# Patient Record
Sex: MALE | URBAN - METROPOLITAN AREA
[De-identification: names, ages, dates, MRNs, and addresses within clinical notes are randomized per-mention and may not be internally consistent; named-entity substitution may affect disease eponyms.]

---

## 2021-07-01 ENCOUNTER — APPOINTMENT (OUTPATIENT)
Dept: URBAN - METROPOLITAN AREA CLINIC 260 | Age: 31
Setting detail: DERMATOLOGY
End: 2021-07-01

## 2021-07-01 VITALS — WEIGHT: 300 LBS | HEIGHT: 77 IN

## 2021-07-01 DIAGNOSIS — L91.8 OTHER HYPERTROPHIC DISORDERS OF THE SKIN: ICD-10-CM

## 2021-07-01 PROCEDURE — OTHER COUNSELING: OTHER

## 2021-07-01 PROCEDURE — OTHER BENIGN DESTRUCTION: OTHER

## 2021-07-01 PROCEDURE — 99202 OFFICE O/P NEW SF 15 MIN: CPT

## 2021-07-01 ASSESSMENT — LOCATION DETAILED DESCRIPTION DERM
LOCATION DETAILED: LEFT INFERIOR POSTERIOR NECK
LOCATION DETAILED: RIGHT SUPERIOR LATERAL NECK
LOCATION DETAILED: LEFT CLAVICULAR NECK
LOCATION DETAILED: MID POSTERIOR NECK
LOCATION DETAILED: LEFT INFERIOR LATERAL NECK
LOCATION DETAILED: RIGHT INFERIOR LATERAL NECK
LOCATION DETAILED: LEFT SUPERIOR LATERAL NECK
LOCATION DETAILED: RIGHT CLAVICULAR NECK
LOCATION DETAILED: RIGHT INFERIOR POSTERIOR NECK
LOCATION DETAILED: LEFT SUPERIOR ANTERIOR NECK

## 2021-07-01 ASSESSMENT — LOCATION SIMPLE DESCRIPTION DERM
LOCATION SIMPLE: RIGHT ANTERIOR NECK
LOCATION SIMPLE: POSTERIOR NECK
LOCATION SIMPLE: LEFT ANTERIOR NECK

## 2021-07-01 ASSESSMENT — LOCATION ZONE DERM: LOCATION ZONE: NECK

## 2021-07-01 NOTE — PROCEDURE: BENIGN DESTRUCTION
Medical Necessity Clause: This procedure was medically necessary because the lesions that were treated were:
Render Note In Bullet Format When Appropriate: No
Consent: The patient's consent was obtained including but not limited to risks of crusting, scabbing, blistering, scarring, darker or lighter pigmentary change, recurrence, incomplete removal and infection.
Post-Care Instructions: I reviewed with the patient in detail post-care instructions. Patient is to wear sunprotection, and avoid picking at any of the treated lesions. Pt may apply Vaseline to crusted or scabbing areas.
Anesthesia Volume In Cc: 0.5
Detail Level: Zone
Medical Necessity Information: It is in your best interest to select a reason for this procedure from the list below. All of these items fulfill various CMS LCD requirements except the new and changing color options.
Total Number Of Lesions Treated: 29

## 2022-08-02 ENCOUNTER — OFFICE VISIT (OUTPATIENT)
Dept: FAMILY MEDICINE | Facility: CLINIC | Age: 32
End: 2022-08-02
Payer: COMMERCIAL

## 2022-08-02 VITALS
TEMPERATURE: 98.2 F | SYSTOLIC BLOOD PRESSURE: 139 MMHG | BODY MASS INDEX: 40.43 KG/M2 | WEIGHT: 315 LBS | DIASTOLIC BLOOD PRESSURE: 84 MMHG | OXYGEN SATURATION: 94 % | HEART RATE: 83 BPM | HEIGHT: 74 IN

## 2022-08-02 DIAGNOSIS — F41.9 ANXIETY AND DEPRESSION: ICD-10-CM

## 2022-08-02 DIAGNOSIS — E66.813 CLASS 3 SEVERE OBESITY WITH SERIOUS COMORBIDITY AND BODY MASS INDEX (BMI) OF 50.0 TO 59.9 IN ADULT, UNSPECIFIED OBESITY TYPE (H): ICD-10-CM

## 2022-08-02 DIAGNOSIS — I10 BENIGN ESSENTIAL HYPERTENSION: ICD-10-CM

## 2022-08-02 DIAGNOSIS — E66.01 MORBID OBESITY (H): ICD-10-CM

## 2022-08-02 DIAGNOSIS — Z11.1 SCREENING EXAMINATION FOR PULMONARY TUBERCULOSIS: ICD-10-CM

## 2022-08-02 DIAGNOSIS — G47.30 SLEEP APNEA, UNSPECIFIED TYPE: ICD-10-CM

## 2022-08-02 DIAGNOSIS — Z80.0 FAMILY HISTORY OF COLON CANCER: ICD-10-CM

## 2022-08-02 DIAGNOSIS — F19.11 HISTORY OF SUBSTANCE ABUSE (H): ICD-10-CM

## 2022-08-02 DIAGNOSIS — E66.01 CLASS 3 SEVERE OBESITY WITH SERIOUS COMORBIDITY AND BODY MASS INDEX (BMI) OF 50.0 TO 59.9 IN ADULT, UNSPECIFIED OBESITY TYPE (H): ICD-10-CM

## 2022-08-02 DIAGNOSIS — Z00.00 HEALTH MAINTENANCE EXAMINATION: Primary | ICD-10-CM

## 2022-08-02 DIAGNOSIS — F32.A ANXIETY AND DEPRESSION: ICD-10-CM

## 2022-08-02 DIAGNOSIS — Z11.3 ROUTINE SCREENING FOR STI (SEXUALLY TRANSMITTED INFECTION): ICD-10-CM

## 2022-08-02 LAB
CREAT UR-MCNC: 294 MG/DL
ERYTHROCYTE [DISTWIDTH] IN BLOOD BY AUTOMATED COUNT: 15.5 % (ref 10–15)
HBA1C MFR BLD: 6 %
HCT VFR BLD AUTO: 41.5 % (ref 40–53)
HGB BLD-MCNC: 13.7 G/DL (ref 13.3–17.7)
MCH RBC QN AUTO: 25.8 PG (ref 26.5–33)
MCHC RBC AUTO-ENTMCNC: 33 G/DL (ref 31.5–36.5)
MCV RBC AUTO: 78 FL (ref 78–100)
MICROALBUMIN UR-MCNC: 46.2 MG/L
MICROALBUMIN/CREAT UR: 15.71 MG/G CR (ref 0–17)
PLATELET # BLD AUTO: 266 10E3/UL (ref 150–450)
RBC # BLD AUTO: 5.31 10E6/UL (ref 4.4–5.9)
WBC # BLD AUTO: 10.4 10E3/UL (ref 4–11)

## 2022-08-02 PROCEDURE — 86803 HEPATITIS C AB TEST: CPT | Performed by: NURSE PRACTITIONER

## 2022-08-02 PROCEDURE — 87591 N.GONORRHOEAE DNA AMP PROB: CPT | Performed by: NURSE PRACTITIONER

## 2022-08-02 PROCEDURE — 85027 COMPLETE CBC AUTOMATED: CPT | Performed by: NURSE PRACTITIONER

## 2022-08-02 PROCEDURE — 82043 UR ALBUMIN QUANTITATIVE: CPT | Performed by: NURSE PRACTITIONER

## 2022-08-02 PROCEDURE — 86708 HEPATITIS A ANTIBODY: CPT | Performed by: NURSE PRACTITIONER

## 2022-08-02 PROCEDURE — 86481 TB AG RESPONSE T-CELL SUSP: CPT | Performed by: NURSE PRACTITIONER

## 2022-08-02 PROCEDURE — 87340 HEPATITIS B SURFACE AG IA: CPT | Performed by: NURSE PRACTITIONER

## 2022-08-02 PROCEDURE — 86704 HEP B CORE ANTIBODY TOTAL: CPT | Performed by: NURSE PRACTITIONER

## 2022-08-02 PROCEDURE — 87389 HIV-1 AG W/HIV-1&-2 AB AG IA: CPT | Performed by: NURSE PRACTITIONER

## 2022-08-02 PROCEDURE — 87491 CHLMYD TRACH DNA AMP PROBE: CPT | Performed by: NURSE PRACTITIONER

## 2022-08-02 PROCEDURE — 83036 HEMOGLOBIN GLYCOSYLATED A1C: CPT | Performed by: NURSE PRACTITIONER

## 2022-08-02 PROCEDURE — 84443 ASSAY THYROID STIM HORMONE: CPT | Performed by: NURSE PRACTITIONER

## 2022-08-02 PROCEDURE — 86780 TREPONEMA PALLIDUM: CPT | Performed by: NURSE PRACTITIONER

## 2022-08-02 PROCEDURE — 86706 HEP B SURFACE ANTIBODY: CPT | Performed by: NURSE PRACTITIONER

## 2022-08-02 PROCEDURE — 80053 COMPREHEN METABOLIC PANEL: CPT | Performed by: NURSE PRACTITIONER

## 2022-08-02 PROCEDURE — 80061 LIPID PANEL: CPT | Performed by: NURSE PRACTITIONER

## 2022-08-02 RX ORDER — MIRTAZAPINE 7.5 MG/1
7.5 TABLET, FILM COATED ORAL AT BEDTIME
COMMUNITY

## 2022-08-02 RX ORDER — HYDROCHLOROTHIAZIDE 25 MG/1
25 TABLET ORAL DAILY
Qty: 30 TABLET | Refills: 1 | Status: SHIPPED | OUTPATIENT
Start: 2022-08-02

## 2022-08-02 ASSESSMENT — ANXIETY QUESTIONNAIRES
5. BEING SO RESTLESS THAT IT IS HARD TO SIT STILL: SEVERAL DAYS
2. NOT BEING ABLE TO STOP OR CONTROL WORRYING: MORE THAN HALF THE DAYS
IF YOU CHECKED OFF ANY PROBLEMS ON THIS QUESTIONNAIRE, HOW DIFFICULT HAVE THESE PROBLEMS MADE IT FOR YOU TO DO YOUR WORK, TAKE CARE OF THINGS AT HOME, OR GET ALONG WITH OTHER PEOPLE: SOMEWHAT DIFFICULT
1. FEELING NERVOUS, ANXIOUS, OR ON EDGE: SEVERAL DAYS
6. BECOMING EASILY ANNOYED OR IRRITABLE: SEVERAL DAYS
3. WORRYING TOO MUCH ABOUT DIFFERENT THINGS: MORE THAN HALF THE DAYS
7. FEELING AFRAID AS IF SOMETHING AWFUL MIGHT HAPPEN: NOT AT ALL
GAD7 TOTAL SCORE: 8
GAD7 TOTAL SCORE: 8

## 2022-08-02 ASSESSMENT — PATIENT HEALTH QUESTIONNAIRE - PHQ9
SUM OF ALL RESPONSES TO PHQ QUESTIONS 1-9: 9
5. POOR APPETITE OR OVEREATING: SEVERAL DAYS

## 2022-08-02 NOTE — NURSING NOTE
"ROOM:1  SHELBY BECKMAN    Preferred Name: Min     32 year old  Chief Complaint   Patient presents with     Physical       Blood pressure (!) 154/102, pulse 81, temperature 98.2  F (36.8  C), temperature source Oral, height 1.89 m (6' 2.4\"), weight (!) 186.9 kg (412 lb 1.6 oz), SpO2 94 %. Body mass index is 52.34 kg/m .  BP completed using cuff size:    There is no problem list on file for this patient.      Wt Readings from Last 2 Encounters:   08/02/22 (!) 186.9 kg (412 lb 1.6 oz)     BP Readings from Last 3 Encounters:   08/02/22 (!) 154/102       Not on File    No current outpatient medications on file.     No current facility-administered medications for this visit.            Honoring Choices - Health Care Directive Guide offered to patient at time of visit.    There are no preventive care reminders to display for this patient.    Immunization History   Administered Date(s) Administered     DTAP (<7y) 1991, 1991, 03/25/1992, 07/05/1994     HepA-Adult 05/06/2011     HepA-ped 2 Dose 07/29/2008     HepB, Unspecified 1991, 1991, 07/05/1994     Hib, Unspecified 1991, 1991, 03/25/1992     Influenza (IIV3) PF 02/21/2007     MMR 07/05/1994, 04/07/2004     Meningococcal (Menactra ) 07/29/2008     Polio, Unspecified  1991, 1991, 07/05/1994     Td (Adult), Adsorbed 04/07/2004     Tdap (Adacel,Boostrix) 07/29/2008       No results found for: PAP    No lab results found.    No flowsheet data found.    No flowsheet data found.    No flowsheet data found.    No flowsheet data found.    Jillian Avila, CEDRIC    August 2, 2022 10:17 AM        "

## 2022-08-02 NOTE — PATIENT INSTRUCTIONS
Health Maintenance  Labs: CMP, CBC, Lipids, STI screen, TB screen, Hepatitis Screen  Dental referral  Healthy diet and weight loss:  more vegetables, less carbohydrates, fried foods, sweets, sweetened beverages, breads, cereals, fast foods, snack foods.  Consider medication to help with weight loss.   Exercise: 30 minutes 5 times per week  Tdap today    High blood pressure   Weight loss and diet/exercise as above. Reduce salt and fried foods in diet.   Hydrochlorothiazide 25mg daily. Will make you urinate more  Eat banana, orange juice as good source of potassium. Follow up 1 week to review lab results  Microalbumin  Headaches may be due to high blood pressure  Elevate legs at rest    Obesity  May benefit from medication to assist with weight loss. Will check labs today: CMP, lipids, TSH and A1c    Sleep Apnea  Referred to Sleep Clinic. EDSON Batista can help you make appointment

## 2022-08-02 NOTE — LETTER
August 5, 2022      Min E Dylan  [address] [facility] [address]        Dear ,    We are writing to inform you of your test results.    Please make an appointment with your provider to review or follow up on your test results.  Appointments can be made by calling [phone].    Resulted Qcjjmb   Comprehensive metabolic panel   Result Value Ref Range    Sodium 143 136 - 145 mmol/L    Potassium 3.7 3.4 - 5.3 mmol/L    Creatinine 0.98 0.67 - 1.17 mg/dL    Urea Nitrogen 16.6 6.0 - 20.0 mg/dL    Chloride 106 98 - 107 mmol/L    Carbon Dioxide (CO2) 23 22 - 29 mmol/L    Anion Gap 14 7 - 15 mmol/L    Glucose 90 70 - 99 mg/dL    Calcium 8.8 8.6 - 10.0 mg/dL    Protein Total 7.4 6.4 - 8.3 g/dL    Albumin 4.2 3.5 - 5.2 g/dL    Bilirubin Total 0.5 <=1.2 mg/dL    Alkaline Phosphatase 71 40 - 129 U/L    AST 53 (H) 10 - 50 U/L    ALT 65 (H) 10 - 50 U/L    GFR Estimate >90 >60 mL/min/1.73m2      Comment:      Effective December 21, 2021 eGFRcr in adults is calculated using the 2021 CKD-EPI creatinine equation which includes age and gender (Chi et al., NE, DOI: 10.1056/LYGOpu7529016)   CBC with platelets   Result Value Ref Range    WBC Count 10.4 4.0 - 11.0 10e3/uL    RBC Count 5.31 4.40 - 5.90 10e6/uL    Hemoglobin 13.7 13.3 - 17.7 g/dL    Hematocrit 41.5 40.0 - 53.0 %    MCV 78 78 - 100 fL    MCH 25.8 (L) 26.5 - 33.0 pg    MCHC 33.0 31.5 - 36.5 g/dL    RDW 15.5 (H) 10.0 - 15.0 %    Platelet Count 266 150 - 450 10e3/uL   Lipid panel reflex to direct LDL Fasting   Result Value Ref Range    Cholesterol 152 <200 mg/dL    Triglycerides 149 <150 mg/dL    Direct Measure HDL 27 (L) >=40 mg/dL    LDL Cholesterol Calculated 95 <=100 mg/dL    Non HDL Cholesterol 125 <130 mg/dL    Narrative    Cholesterol  Desirable:  <200 mg/dL    Triglycerides  Normal:  Less than 150 mg/dL  Borderline High:  150-199 mg/dL  High:  200-499 mg/dL  Very High:  Greater than or equal to 500 mg/dL    Direct Measure HDL  Female:  Greater  than or equal to 50 mg/dL   Male:  Greater than or equal to 40 mg/dL    LDL Cholesterol  Desirable:  <100mg/dL  Above Desirable:  100-129 mg/dL   Borderline High:  130-159 mg/dL   High:  160-189 mg/dL   Very High:  >= 190 mg/dL    Non HDL Cholesterol  Desirable:  130 mg/dL  Above Desirable:  130-159 mg/dL  Borderline High:  160-189 mg/dL  High:  190-219 mg/dL  Very High:  Greater than or equal to 220 mg/dL   Hemoglobin A1c   Result Value Ref Range    Hemoglobin A1C 6.0 (H) <5.7 %      Comment:      Normal <5.7%   Prediabetes 5.7-6.4%    Diabetes 6.5% or higher     Note: Adopted from ADA consensus guidelines.   TSH with free T4 reflex   Result Value Ref Range    TSH 2.28 0.30 - 4.20 uIU/mL   Treponema Abs w Reflex to RPR and Titer   Result Value Ref Range    Treponema Antibody Total Nonreactive Nonreactive   HIV Antigen Antibody Combo   Result Value Ref Range    HIV Antigen Antibody Combo Nonreactive Nonreactive      Comment:      HIV-1 p24 Ag & HIV-1/HIV-2 Ab Not Detected   Hepatitis Antibody A IgG   Result Value Ref Range    Hepatitis A Antibody IgG Reactive (A) Nonreactive    Narrative    This assay cannot be used for the diagnosis of acute HAV infection.   Hepatitis B surface antigen   Result Value Ref Range    Hepatitis B Surface Antigen Nonreactive Nonreactive   Hepatitis B Surface Antibody   Result Value Ref Range    Hepatitis B Surface Antibody 12.79 <8.00 m[IU]/mL      Comment:      Reactive, Patient is considered to be immune to infection with hepatitis B when the value is greater than or equal to 12.0 mIU/mL.   Hepatitis B core antibody   Result Value Ref Range    Hepatitis B Core Antibody Total Nonreactive Nonreactive   Hepatitis C Screen Reflex to HCV RNA Quant and Genotype   Result Value Ref Range    Hepatitis C Antibody Nonreactive Nonreactive    Narrative    Assay performance characteristics have not been established for newborns, infants, and children.   Quantiferon TB Gold Plus Grey Tube   Result  Value Ref Range    Quantiferon Nil Tube 0.03 IU/mL   Quantiferon TB Gold Plus Green Tube   Result Value Ref Range    Quantiferon TB1 Tube 0.04 IU/mL   Quantiferon TB Gold Plus Yellow Tube   Result Value Ref Range    Quantiferon TB2 Tube 0.04    Quantiferon TB Gold Plus Purple Tube   Result Value Ref Range    Quantiferon Mitogen 10.00 IU/mL   NEISSERIA GONORRHOEA PCR   Result Value Ref Range    Neisseria gonorrhoeae Negative Negative      Comment:      Negative for N. gonorrhoeae rRNA by transcription mediated amplification. A negative result by transcription mediated amplification does not preclude the presence of C. trachomatis infection because results are dependent on proper and adequate collection, absence of inhibitors and sufficient rRNA to be detected.   CHLAMYDIA TRACHOMATIS PCR   Result Value Ref Range    Chlamydia trachomatis Negative Negative      Comment:      A negative result by transcription mediated amplification does not preclude the presence of C. trachomatis infection because results are dependent on proper and adequate collection, absence of inhibitors and sufficient rRNA to be detected.   Albumin Random Urine Quantitative with Creat Ratio   Result Value Ref Range    Albumin Urine mg/L 46.2 mg/L      Comment:      The reference ranges have not been established in urine albumin. The results should be integrated into the clinical context for interpretation.    Albumin Urine mg/g Cr 15.71 0.00 - 17.00 mg/g Cr      Comment:      Microalbuminuria is defined as an albumin:creatinine ratio of 17 to 299 for males and 25 to 299 for females. A ratio of albumin:creatinine of 300 or higher is indicative of overt proteinuria.  Due to biologic variability, positive results should be confirmed by a second, first-morning random or 24-hour timed urine specimen. If there is discrepancy, a third specimen is recommended. When 2 out of 3 results are in the microalbuminuria range, this is evidence for incipient  nephropathy and warrants increased efforts at glucose control, blood pressure control, and institution of therapy with an angiotensin-converting-enzyme (ACE) inhibitor (if the patient can tolerate it).      Creatinine Urine mg/dL 294.0 mg/dL      Comment:      The reference ranges have not been established in urine creatinine. The results should be integrated into the clinical context for interpretation.   Quantiferon TB Gold Plus   Result Value Ref Range    Quantiferon-TB Gold Plus Negative Negative      Comment:      No interferon gamma response to M.tuberculosis antigens was detected. Infection with M.tuberculosis is unlikely, however a single negative result does not exclude infection. In patients at high risk for infection, a second test should be considered in accordance with the 2017 ATS/IDSA/CDC Clinical Pract  ice Guidelines for Diagnosis of Tuberculosis in Adults and Children     TB1 Ag minus Nil Value 0.01 IU/mL    TB2 Ag minus Nil Value 0.01 IU/mL    Mitogen minus Nil Result 9.97 IU/mL    Nil Result 0.03 IU/mL   Azar Richardson, here are your lab results. Your liver enzymes are a little high, so we should recheck these in a couple of weeks. Your 3 month blood sugar average is above normal, meaning you have pre-diabetes. Working on low fat, low carbohydrate diet (fewer fried foods, salty foods, snack and fast foods, sweets and sweetened bevarages, breads and cereals) will be helpful. Try to get more fresh vegetables in your diet. This will help reduce your blood pressure also. Working on weight loss will be helpful. If you would like to go to a weight management clinic, let me know. The rest of your labs are normal. Your kidney function is okay, you are not anemic. Your thyroid is normal. Screening for sexually transmitted infections and tuberculosis is negative. Please let me know if you have questions. Thank you.    If you have any questions or concerns, please call the clinic at the number listed above.        Sincerely,      SHARMIN Gonzalez CNP

## 2022-08-02 NOTE — NURSING NOTE
Depression Response    Patient completed the PHQ-9 assessment for depression and scored >9? Yes  Question 9 on the PHQ-9 was positive for suicidality? No  Does patient have current mental health provider? Yes, seeing therapist at [facility]    Is this a virtual visit? No    I personally notified the following: visit provider

## 2022-08-02 NOTE — PROGRESS NOTES
Patient: Min Richardson YOB: 1990  Date of Exam:    Arrival Time: 09 53 AM  Departure Time: 11 10 PM provider ended. Left clinic 11:35am    Allergies: No Known Allergies    Patient Concerns:   Chief Complaint   Patient presents with     Physical       Immunizations:   Most Recent Immunizations   Administered Date(s) Administered     DTAP (<7y) 1994     HepA-Adult 2011     HepA-ped 2 Dose 2008     HepB, Unspecified 1994     Hib, Unspecified 1992     Influenza (IIV3) PF 2007     MMR 2004     Meningococcal (Menactra ) 2008     Polio, Unspecified  1994     Td (Adult), Adsorbed 2004     Tdap (Adacel,Boostrix) 2008       Do you need any refills on your Medications today? No    Free of communicable diseases? No observable findings for infectious disease  32 year-old male obese male presents to clinic for physical exam. Has been at [facility] x 2 months. Drug of choice Fentanyl. No prior treatments. Denies health concerns.       MH: Getting counseling at [facility]. Resides [facility]. Was started on med Mirtazapine at  1 week ago but reports only getting this one night. Was supposed to help with mood and sleep. Reports he is hesitant to take meds, but could try it.  Denies suicidal thoughts. Has not been on medications for depression or anxiety in the past.     Sleep apnea, wakes every 2 hours, not rested. Does not have CPAP.     Health Maintenance:   No eye exam or dental exam.   FMH: mother  of colon cancer age 47 years.     Past Medical History Reviewed? Yes.  ROS  GEN: negative for fever, fatigue. Notes weight change: increase since HS, but worse over past 1year. Denies [facility] DM  HEENT: negative for vision changes, eye irritation, ear pain, nasal congestion, rhinorrhea, sore throat or teeth pain  NECK: negative for pain stiffness  RESP: negative for SOB, cough, wheeze  CV: negative for chest pain, irregular heart beat,  "peripheral edema  GI: negative for nausea or vomiting, abdominal pain, heartburn, stool pattern change, constipation or diarrhea. Daily BM  : negative for dysuria, urgency, frequency or penile discharge. Denies risk for STI.   MSK: negative for bone or joint pain or stiffness  NEURO: occasional headache; denies dizziness, numbness, tingling or weakness  ENDO: negative for increased thirst or urination or temperature intolerance. Does report weight gain and dry skin  HEME: negative for bruising or bleeding  DERM: negative for rash or lesions  MOOD: endorses some depressed mood and anxiety        General Physical Exam:  Vitals: BP (!) 154/102   Pulse 81   Temp 98.2  F (36.8  C) (Oral)   Ht 1.89 m (6' 2.4\")   Wt (!) 186.9 kg (412 lb 1.6 oz)   SpO2 94%   BMI 52.34 kg/m     BP elevated, recheck:     BMI 52.34    GEN: alert, in no acute distress. Appropriate to conversation  HEENT: Eyes clear, DENICE, EOM intact. Discs crisp. Ears: TMs gray with LR, canals intact. Nose: mild edema with scant nasal discharge, OP clear; dentition intact. Tonsils without erythema or edema.   NECK: supple. Thyroid smooth and not enlarged.   LYMPH: negative, nontender  RESP: :Lungs clear to auscultation with air entry throughout  CV: HRRR, S1, S2 no murmur, rub or gallop. Pedal pulses +2 bilaterally with 1+ pitting edema ankles, R>L.  ABD: Soft rounded with BSx4. No HSM,  nontender. No masses  : declined  MSK: Full ROM spine, extremities. No bony deformity or swelling over joints  NEURO: Cranial nerves 2-12 intact.    MOOD: appropriate, intact  DERM: no lesions or rashes. Skin turgor smooth and elastic. Has tattoos    CRISTOBAL-7 SCORE 8/2/2022   Total Score 8       PHQ 8/2/2022   PHQ-9 Total Score 9   Q9: Thoughts of better off dead/self-harm past 2 weeks Not at all       Diagnoses:   1. Health maintenance examination  Reviewed healthy living, given information about high BP, healthy diet and weight loss. Discussed follow up in 1 week to " review lab results, additional treatments. Consider referral for weight management. Risk factors: obesity, HTN, sleep apnea, physical labor, history substance abuse  - TDAP VACCINE (Adacel, Boostrix)  [[id number]]  - Dental Referral    2. History of substance abuse (H)  Routine lab work up.   - Comprehensive metabolic panel; Future  - CBC with platelets; Future  - Comprehensive metabolic panel  - CBC with platelets    3. Class 3 severe obesity with serious comorbidity and body mass index (BMI) of 50.0 to 59.9 in adult, unspecified obesity type (H)  As above, consider referral for weight management. Will check A1c, Might benefit from metformin or ozempic. Reviewed lifestyle  - Lipid panel reflex to direct LDL Fasting; Future  - Hemoglobin A1c; Future  - TSH with free T4 reflex; Future  - Hepatitis Antibody A IgG; Future  - Hepatitis B surface antigen; Future  - Hepatitis B Surface Antibody; Future  - Hepatitis B core antibody; Future  - Hepatitis C Screen Reflex to HCV RNA Quant and Genotype; Future  - Lipid panel reflex to direct LDL Fasting  - Hemoglobin A1c  - TSH with free T4 reflex  - Hepatitis Antibody A IgG  - Hepatitis B surface antigen  - Hepatitis B Surface Antibody  - Hepatitis B core antibody  - Hepatitis C Screen Reflex to HCV RNA Quant and Genotype    4. Benign essential hypertension  As above: reduce salt and fat in diet. Reviewed healthy lifestyle. Discussed side effects of medication, high potassium foods reviewed. Plan recheck K+ in 1 week.   - hydrochlorothiazide (HYDRODIURIL) 25 MG tablet; Take 1 tablet (25 mg) by mouth daily  Dispense: 30 tablet; Refill: 1  - Albumin Random Urine Quantitative with Creat Ratio; Future    5. Sleep apnea, unspecified type  Referred to sleep clinic  - Sleep Lab IP Consult    6. Routine screening for STI (sexually transmitted infection)  Asymptomatic, denies risk; routine screen  - NEISSERIA GONORRHOEA PCR; Future  - CHLAMYDIA TRACHOMATIS PCR; Future  - Treponema Abs w  Reflex to RPR and Titer; Future  - HIV Antigen Antibody Combo; Future  - Treponema Abs w Reflex to RPR and Titer  - HIV Antigen Antibody Combo    7. Screening examination for pulmonary tuberculosis  Asymptomatic, routine screen  - Quantiferon TB Gold Plus; Future  - Quantiferon TB Gold Plus    8. Morbid obesity (H)  As above    Recommended Diet and Special Instructions: Yes: reduced salt and fat; weight loss.   Limitations or restrictions for activities: No but needs BP control  Information Pertinent to treatment in case of emergency has cardiac risk given obesity and elevated BP  Discussed taking mirtazapine regularly for next 1 week. Can titrate and/or refill at follow up encounter.   Discussed [facility] colon cancer, plan to commence screening ~age 38 years    Medication Changes:   MEDICATIONS:   Orders Placed This Encounter   Medications     mirtazapine (REMERON) 7.5 MG tablet     Sig: Take 7.5 mg by mouth At Bedtime     hydrochlorothiazide (HYDRODIURIL) 25 MG tablet     Sig: Take 1 tablet (25 mg) by mouth daily     Dispense:  30 tablet     Refill:  1     There are no discontinued medications.       - Continue other medications without change    Referrals   Referral to Sleep clinic and Referral to Dental - Please call [phone] to schedule your appointment    Follow up plan   Please make an in clinic appointment for follow up in 1  week for lab results, weight management follow up, BP and med management.      SHARMIN Gonzalez CNP

## 2022-08-03 LAB
ALBUMIN SERPL BCG-MCNC: 4.2 G/DL (ref 3.5–5.2)
ALP SERPL-CCNC: 71 U/L (ref 40–129)
ALT SERPL W P-5'-P-CCNC: 65 U/L (ref 10–50)
ANION GAP SERPL CALCULATED.3IONS-SCNC: 14 MMOL/L (ref 7–15)
AST SERPL W P-5'-P-CCNC: 53 U/L (ref 10–50)
BILIRUB SERPL-MCNC: 0.5 MG/DL
BUN SERPL-MCNC: 16.6 MG/DL (ref 6–20)
C TRACH DNA SPEC QL NAA+PROBE: NEGATIVE
CALCIUM SERPL-MCNC: 8.8 MG/DL (ref 8.6–10)
CHLORIDE SERPL-SCNC: 106 MMOL/L (ref 98–107)
CHOLEST SERPL-MCNC: 152 MG/DL
CREAT SERPL-MCNC: 0.98 MG/DL (ref 0.67–1.17)
DEPRECATED HCO3 PLAS-SCNC: 23 MMOL/L (ref 22–29)
GFR SERPL CREATININE-BSD FRML MDRD: >90 ML/MIN/1.73M2
GLUCOSE SERPL-MCNC: 90 MG/DL (ref 70–99)
HAV IGG SER QL IA: REACTIVE
HBV CORE AB SERPL QL IA: NONREACTIVE
HBV SURFACE AB SERPL IA-ACNC: 12.79 M[IU]/ML
HBV SURFACE AG SERPL QL IA: NONREACTIVE
HCV AB SERPL QL IA: NONREACTIVE
HDLC SERPL-MCNC: 27 MG/DL
HIV 1+2 AB+HIV1 P24 AG SERPL QL IA: NONREACTIVE
LDLC SERPL CALC-MCNC: 95 MG/DL
N GONORRHOEA DNA SPEC QL NAA+PROBE: NEGATIVE
NONHDLC SERPL-MCNC: 125 MG/DL
POTASSIUM SERPL-SCNC: 3.7 MMOL/L (ref 3.4–5.3)
PROT SERPL-MCNC: 7.4 G/DL (ref 6.4–8.3)
SODIUM SERPL-SCNC: 143 MMOL/L (ref 136–145)
T PALLIDUM AB SER QL: NONREACTIVE
TRIGL SERPL-MCNC: 149 MG/DL
TSH SERPL DL<=0.005 MIU/L-ACNC: 2.28 UIU/ML (ref 0.3–4.2)

## 2022-08-04 LAB
GAMMA INTERFERON BACKGROUND BLD IA-ACNC: 0.03 IU/ML
M TB IFN-G BLD-IMP: NEGATIVE
M TB IFN-G CD4+ BCKGRND COR BLD-ACNC: 9.97 IU/ML
MITOGEN IGNF BCKGRD COR BLD-ACNC: 0.01 IU/ML
MITOGEN IGNF BCKGRD COR BLD-ACNC: 0.01 IU/ML
QUANTIFERON MITOGEN: 10 IU/ML
QUANTIFERON NIL TUBE: 0.03 IU/ML
QUANTIFERON TB1 TUBE: 0.04 IU/ML
QUANTIFERON TB2 TUBE: 0.04